# Patient Record
Sex: MALE | URBAN - METROPOLITAN AREA
[De-identification: names, ages, dates, MRNs, and addresses within clinical notes are randomized per-mention and may not be internally consistent; named-entity substitution may affect disease eponyms.]

---

## 2024-04-03 ENCOUNTER — OV NP (OUTPATIENT)
Dept: URBAN - METROPOLITAN AREA CLINIC 17 | Facility: CLINIC | Age: 79
End: 2024-04-03

## 2024-04-09 ENCOUNTER — LAB (OUTPATIENT)
Dept: URBAN - METROPOLITAN AREA CLINIC 17 | Facility: CLINIC | Age: 79
End: 2024-04-09

## 2024-04-09 ENCOUNTER — OV NP (OUTPATIENT)
Dept: URBAN - METROPOLITAN AREA CLINIC 17 | Facility: CLINIC | Age: 79
End: 2024-04-09
Payer: MEDICARE

## 2024-04-09 VITALS
TEMPERATURE: 97 F | DIASTOLIC BLOOD PRESSURE: 68 MMHG | WEIGHT: 187 LBS | HEART RATE: 63 BPM | SYSTOLIC BLOOD PRESSURE: 127 MMHG | BODY MASS INDEX: 28.34 KG/M2 | HEIGHT: 68 IN

## 2024-04-09 DIAGNOSIS — Z86.010 PERSONAL HISTORY OF COLONIC POLYPS: ICD-10-CM

## 2024-04-09 DIAGNOSIS — R15.9 FULL INCONTINENCE OF FECES: ICD-10-CM

## 2024-04-09 DIAGNOSIS — K52.89 OTHER SPECIFIED NONINFECTIVE GASTROENTERITIS AND COLITIS: ICD-10-CM

## 2024-04-09 PROBLEM — 1086911000119107: Status: ACTIVE | Noted: 2024-04-09

## 2024-04-09 PROCEDURE — 99204 OFFICE O/P NEW MOD 45 MIN: CPT

## 2024-04-09 RX ORDER — LEUCOVORIN/PYRIDOX/MECOBALAMIN 4-50-2 MG
TAKE 1 TABLET BY MOUTH EVERY DAY FOR 90 DAYS TABLET ORAL
Qty: 90 EACH | Refills: 0 | Status: ACTIVE | COMMUNITY

## 2024-04-09 RX ORDER — MEMANTINE 10 MG/1
TABLET, FILM COATED ORAL
Qty: 180 TABLET | Status: ACTIVE | COMMUNITY

## 2024-04-09 RX ORDER — CARVEDILOL 12.5 MG/1
TABLET, FILM COATED ORAL
Qty: 180 TABLET | Status: ACTIVE | COMMUNITY

## 2024-04-09 RX ORDER — AMLODIPINE BESYLATE 10 MG/1
TABLET ORAL
Qty: 90 TABLET | Status: ACTIVE | COMMUNITY

## 2024-04-09 RX ORDER — PRAVASTATIN SODIUM 40 MG/1
TAKE 1 TABLET BY MOUTH EVERY DAY TABLET ORAL
Qty: 90 EACH | Refills: 0 | Status: ACTIVE | COMMUNITY

## 2024-04-09 RX ORDER — ZIPRASIDONE HYDROCHLORIDE 20 MG/1
CAPSULE ORAL
Qty: 180 CAPSULE | Status: ACTIVE | COMMUNITY

## 2024-04-09 RX ORDER — LOSARTAN POTASSIUM 100 MG/1
TABLET, FILM COATED ORAL
Qty: 90 TABLET | Status: ACTIVE | COMMUNITY

## 2024-04-09 RX ORDER — CYANOCOBALAMIN 1000 UG/ML
INJECT 1ML INTRAMUSCULARLY ONCE A MONTH INJECTION INTRAMUSCULAR; SUBCUTANEOUS
Qty: 3 MILLILITER | Refills: 0 | Status: ACTIVE | COMMUNITY

## 2024-04-09 RX ORDER — DONEPEZIL HYDROCHLORIDE 10 MG/1
TAKE 1 TABLET BY MOUTH EVERY DAY AT BEDTIME FOR 30 DAYS TABLET, FILM COATED ORAL
Qty: 90 EACH | Refills: 0 | Status: ACTIVE | COMMUNITY

## 2024-04-09 RX ORDER — MEMANTINE 10 MG/1
TAKE 2 TABLETS BY MOUTH EVERY DAY FOR 90 DAYS TABLET, FILM COATED ORAL
Qty: 180 EACH | Refills: 0 | Status: ACTIVE | COMMUNITY

## 2024-04-09 NOTE — HPI-TODAY'S VISIT:
New pt 80 yo M with PHM of Alzheimer's dementia, HTN, HLD, and vit B12 deficiency, here with his wife who is his caregiver.  The patient presents for a colonoscopy. The patient has a personal history of colon polyps. There is no family history of colon polyps or colon cancer. Patient's wife admits to fecal incontenince frequently for the past year, sometimes he will have a BM in the shower. Denies constipation cycling.  Denies changes in appetite and weight. Patient denies rectal bleeding. Patient denies any lung or kidney problems. Patient denies any digestive symptoms currently. Last colonoscopy: 15 or 16 years ago, per pt's wife he had some polyps removed.

## 2024-04-10 LAB
A/G RATIO: 1.1
ALBUMIN: 3.9
ALKALINE PHOSPHATASE: 79
ALT (SGPT): 31
AST (SGOT): 29
BILIRUBIN, TOTAL: 0.6
BUN/CREATININE RATIO: 15
BUN: 20
CALCIUM: 9.5
CARBON DIOXIDE, TOTAL: 25
CHLORIDE: 106
CREATININE: 1.34
EGFR: 53
GLOBULIN, TOTAL: 3.5
GLUCOSE: 99
IMMUNOGLOBULIN A, QN, SERUM: 356
LIPASE: 84
POTASSIUM: 3.9
PROTEIN, TOTAL: 7.4
SODIUM: 143
T-TRANSGLUTAMINASE (TTG) IGA: <1
TSH W/REFLEX TO FT4: 1.08

## 2024-05-02 ENCOUNTER — TELEPHONE ENCOUNTER (OUTPATIENT)
Dept: URBAN - METROPOLITAN AREA CLINIC 92 | Facility: CLINIC | Age: 79
End: 2024-05-02

## 2024-05-02 RX ORDER — POLYETHYLENE GLYCOL-3350 AND ELECTROLYTES WITH FLAVOR PACK 240; 5.84; 2.98; 6.72; 22.72 G/278.26G; G/278.26G; G/278.26G; G/278.26G; G/278.26G
4000 ML POWDER, FOR SOLUTION ORAL ONCE A DAY
Qty: 4000 ML | Refills: 0 | OUTPATIENT
Start: 2024-05-05 | End: 2024-05-06

## 2024-05-24 ENCOUNTER — TELEPHONE ENCOUNTER (OUTPATIENT)
Dept: URBAN - METROPOLITAN AREA CLINIC 92 | Facility: CLINIC | Age: 79
End: 2024-05-24

## 2024-06-18 ENCOUNTER — OFFICE VISIT (OUTPATIENT)
Dept: URBAN - METROPOLITAN AREA MEDICAL CENTER 12 | Facility: MEDICAL CENTER | Age: 79
End: 2024-06-18